# Patient Record
Sex: MALE | Race: ASIAN | Employment: FULL TIME
[De-identification: names, ages, dates, MRNs, and addresses within clinical notes are randomized per-mention and may not be internally consistent; named-entity substitution may affect disease eponyms.]

---

## 2023-11-24 ENCOUNTER — HOSPITAL ENCOUNTER (EMERGENCY)
Facility: HOSPITAL | Age: 23
Discharge: HOME | End: 2023-11-25
Attending: STUDENT IN AN ORGANIZED HEALTH CARE EDUCATION/TRAINING PROGRAM

## 2023-11-24 VITALS
RESPIRATION RATE: 18 BRPM | HEIGHT: 68 IN | TEMPERATURE: 97.6 F | OXYGEN SATURATION: 97 % | SYSTOLIC BLOOD PRESSURE: 127 MMHG | HEART RATE: 70 BPM | BODY MASS INDEX: 21.89 KG/M2 | WEIGHT: 144.4 LBS | DIASTOLIC BLOOD PRESSURE: 97 MMHG

## 2023-11-24 DIAGNOSIS — S61.216A LACERATION OF RIGHT LITTLE FINGER WITHOUT FOREIGN BODY WITHOUT DAMAGE TO NAIL, INITIAL ENCOUNTER: Primary | ICD-10-CM

## 2023-11-24 PROCEDURE — 12001 RPR S/N/AX/GEN/TRNK 2.5CM/<: CPT

## 2023-11-24 PROCEDURE — 99282 EMERGENCY DEPT VISIT SF MDM: CPT

## 2023-11-24 ASSESSMENT — PAIN DESCRIPTION - PAIN TYPE: TYPE: ACUTE PAIN

## 2023-11-24 ASSESSMENT — PAIN - FUNCTIONAL ASSESSMENT
PAIN_FUNCTIONAL_ASSESSMENT: 0-10
PAIN_FUNCTIONAL_ASSESSMENT: ACTIVITIES ARE NOT PREVENTED

## 2023-11-24 ASSESSMENT — PAIN DESCRIPTION - LOCATION: LOCATION: FINGER (COMMENT WHICH ONE)

## 2023-11-24 ASSESSMENT — PAIN DESCRIPTION - ONSET: ONSET: ON-GOING

## 2023-11-24 ASSESSMENT — PAIN SCALES - GENERAL: PAINLEVEL_OUTOF10: 2

## 2023-11-24 ASSESSMENT — PAIN DESCRIPTION - FREQUENCY: FREQUENCY: CONTINUOUS

## 2023-11-24 ASSESSMENT — PAIN DESCRIPTION - ORIENTATION: ORIENTATION: RIGHT

## 2023-11-24 ASSESSMENT — PAIN DESCRIPTION - DESCRIPTORS: DESCRIPTORS: ACHING

## 2023-11-25 PROCEDURE — 6370000000 HC RX 637 (ALT 250 FOR IP): Performed by: STUDENT IN AN ORGANIZED HEALTH CARE EDUCATION/TRAINING PROGRAM

## 2023-11-25 RX ADMIN — Medication 3 ML: at 00:14

## 2023-11-25 NOTE — ED TRIAGE NOTES
[facility] Nursing Note        Patient Name: Bennett Paulino      : 2000             MRN: [id number]      Chief Complaint:  Finger Laceration      Admit Diagnosis: No admission diagnoses are documented for this encounter. Admitting Provider: No admitting provider for patient encounter. Surgery: * No surgery found *           Patient arrived to the ER ambulatory from home with complaints of sustaining a Right Pinkie Finger Laceration while washing a glass tonight. Pt states he can't remember when his last Tetanus shot was. Wound wrapped, bleeding controlled.          Lines:        Signed by: Yony Singleton RN, JOVANI, BSN, [facility]                                              2023 at 11:22 PM

## 2023-11-25 NOTE — ED PROVIDER NOTES
HPI    24 y.o. male presenting with laceration on his right small finger on a broken clean glass while washing dishes at home. Able to move the digit and has no numbness or tingling. Unsure of his last tetanus immunization. No gross contamination. Jackie Pete   has no past medical history on file. Physical Exam  Vitals reviewed. Constitutional:       General: He is not in acute distress. Appearance: Normal appearance. HENT:      Head: Normocephalic. Mouth/Throat:      Mouth: Mucous membranes are moist.   Eyes:      Extraocular Movements: Extraocular movements intact. Pupils: Pupils are equal, round, and reactive to light. Cardiovascular:      Pulses: Normal pulses. Pulmonary:      Effort: Pulmonary effort is normal. No respiratory distress. Abdominal:      General: Abdomen is flat. Musculoskeletal:      Cervical back: Neck supple. No rigidity. Right lower leg: No edema. Left lower leg: No edema. Comments: Superficial laceration of the right small finger over the middle phalanx. Hemostatic. Approximately 2 cm. No visible tendon. Normal ROM at the isolated DIP, PIP and MCP. Normal sensation light touch. Skin:     General: Skin is warm. Capillary Refill: Capillary refill takes less than 2 seconds. Neurological:      General: No focal deficit present. Mental Status: He is alert. Mental status is at baseline.    Psychiatric:         Mood and Affect: Mood normal.       Lac Repair    Date/Time: 11/24/2023 11:30 PM    Performed by: Sosa Taylor MD  Authorized by: Sosa Taylor MD    Consent:     Consent obtained:  Verbal    Consent given by:  Patient    Risks discussed:  Infection, need for additional repair, pain and poor cosmetic result  Anesthesia:     Anesthesia method:  None  Laceration details:     Location:  Finger    Finger location:  R small finger    Length (cm):  2  Exploration:     Hemostasis achieved with:  Direct pressure    Wound